# Patient Record
Sex: FEMALE | Race: WHITE | NOT HISPANIC OR LATINO | Employment: FULL TIME | URBAN - METROPOLITAN AREA
[De-identification: names, ages, dates, MRNs, and addresses within clinical notes are randomized per-mention and may not be internally consistent; named-entity substitution may affect disease eponyms.]

---

## 2017-01-13 ENCOUNTER — OFFICE VISIT (OUTPATIENT)
Dept: INTERNAL MEDICINE | Facility: CLINIC | Age: 42
End: 2017-01-13

## 2017-01-13 VITALS
TEMPERATURE: 97 F | HEART RATE: 77 BPM | SYSTOLIC BLOOD PRESSURE: 110 MMHG | BODY MASS INDEX: 40.62 KG/M2 | OXYGEN SATURATION: 98 % | HEIGHT: 67 IN | DIASTOLIC BLOOD PRESSURE: 68 MMHG | WEIGHT: 258.8 LBS

## 2017-01-13 DIAGNOSIS — Z53.21 PATIENT LEFT WITHOUT BEING SEEN: Primary | ICD-10-CM

## 2017-02-12 ENCOUNTER — ANESTHESIA EVENT (OUTPATIENT)
Dept: PERIOP | Facility: HOSPITAL | Age: 42
End: 2017-02-12

## 2017-02-13 ENCOUNTER — HOSPITAL ENCOUNTER (OUTPATIENT)
Facility: HOSPITAL | Age: 42
Discharge: HOME | End: 2017-02-14
Attending: FAMILY MEDICINE | Admitting: FAMILY MEDICINE

## 2017-02-13 ENCOUNTER — ANESTHESIA (OUTPATIENT)
Dept: PERIOP | Facility: HOSPITAL | Age: 42
End: 2017-02-13

## 2017-02-13 DIAGNOSIS — N62 MACROMASTIA: ICD-10-CM

## 2017-02-13 LAB
B-HCG UR QL: NEGATIVE
DEPRECATED RDW RBC AUTO: 42.1 FL (ref 37–54)
ERYTHROCYTE [DISTWIDTH] IN BLOOD BY AUTOMATED COUNT: 12.3 % (ref 11.3–14.5)
HCT VFR BLD AUTO: 37.1 % (ref 34.5–44)
HGB BLD-MCNC: 12.2 G/DL (ref 11.5–15.5)
INTERNAL NEGATIVE CONTROL: NORMAL
INTERNAL POSITIVE CONTROL: REACTIVE
Lab: NORMAL
MCH RBC QN AUTO: 30.7 PG (ref 27–31)
MCHC RBC AUTO-ENTMCNC: 32.9 G/DL (ref 32–36)
MCV RBC AUTO: 93.2 FL (ref 80–99)
PLATELET # BLD AUTO: 195 10*3/MM3 (ref 150–450)
PMV BLD AUTO: 11.5 FL (ref 6–12)
RBC # BLD AUTO: 3.98 10*6/MM3 (ref 3.89–5.14)
WBC NRBC COR # BLD: 12.09 10*3/MM3 (ref 3.5–10.8)

## 2017-02-13 PROCEDURE — 25010000002 EPINEPHRINE PER 0.1 MG: Performed by: FAMILY MEDICINE

## 2017-02-13 PROCEDURE — 25010000002 FENTANYL CITRATE (PF) 100 MCG/2ML SOLUTION: Performed by: NURSE ANESTHETIST, CERTIFIED REGISTERED

## 2017-02-13 PROCEDURE — 25010000002 BUPRENORPHINE PER 0.1 MG: Performed by: NURSE ANESTHETIST, CERTIFIED REGISTERED

## 2017-02-13 PROCEDURE — 25010000002 PROPOFOL 1000 MG/ML EMULSION: Performed by: NURSE ANESTHETIST, CERTIFIED REGISTERED

## 2017-02-13 PROCEDURE — 25010000002 DEXAMETHASONE PER 1 MG: Performed by: NURSE ANESTHETIST, CERTIFIED REGISTERED

## 2017-02-13 PROCEDURE — 25010000002 PROPOFOL 10 MG/ML EMULSION: Performed by: NURSE ANESTHETIST, CERTIFIED REGISTERED

## 2017-02-13 PROCEDURE — 85027 COMPLETE CBC AUTOMATED: CPT | Performed by: ANESTHESIOLOGY

## 2017-02-13 PROCEDURE — 88305 TISSUE EXAM BY PATHOLOGIST: CPT | Performed by: FAMILY MEDICINE

## 2017-02-13 PROCEDURE — 25010000002 DEXAMETHASONE SODIUM PHOSPHATE 10 MG/ML SOLUTION: Performed by: NURSE ANESTHETIST, CERTIFIED REGISTERED

## 2017-02-13 PROCEDURE — 25010000002 MIDAZOLAM PER 1 MG: Performed by: NURSE ANESTHETIST, CERTIFIED REGISTERED

## 2017-02-13 PROCEDURE — 25010000003 CEFAZOLIN PER 500 MG: Performed by: FAMILY MEDICINE

## 2017-02-13 PROCEDURE — 25010000002 ONDANSETRON PER 1 MG: Performed by: NURSE ANESTHETIST, CERTIFIED REGISTERED

## 2017-02-13 PROCEDURE — 25010000002 HYDROMORPHONE PER 4 MG: Performed by: NURSE ANESTHETIST, CERTIFIED REGISTERED

## 2017-02-13 RX ORDER — PANTOPRAZOLE SODIUM 40 MG/1
40 TABLET, DELAYED RELEASE ORAL
Status: DISCONTINUED | OUTPATIENT
Start: 2017-02-14 | End: 2017-02-14 | Stop reason: HOSPADM

## 2017-02-13 RX ORDER — CAFFEINE 200 MG
200 TABLET ORAL DAILY
COMMUNITY
End: 2020-09-29

## 2017-02-13 RX ORDER — PROMETHAZINE HYDROCHLORIDE 25 MG/1
25 TABLET ORAL ONCE AS NEEDED
Status: DISCONTINUED | OUTPATIENT
Start: 2017-02-13 | End: 2017-02-13 | Stop reason: HOSPADM

## 2017-02-13 RX ORDER — LIDOCAINE HYDROCHLORIDE 10 MG/ML
INJECTION, SOLUTION INFILTRATION; PERINEURAL AS NEEDED
Status: DISCONTINUED | OUTPATIENT
Start: 2017-02-13 | End: 2017-02-13 | Stop reason: SURG

## 2017-02-13 RX ORDER — CARISOPRODOL 350 MG/1
350 TABLET ORAL EVERY 8 HOURS PRN
Status: DISCONTINUED | OUTPATIENT
Start: 2017-02-13 | End: 2017-02-14 | Stop reason: HOSPADM

## 2017-02-13 RX ORDER — SODIUM CHLORIDE 0.9 % (FLUSH) 0.9 %
1-10 SYRINGE (ML) INJECTION AS NEEDED
Status: DISCONTINUED | OUTPATIENT
Start: 2017-02-13 | End: 2017-02-13 | Stop reason: HOSPADM

## 2017-02-13 RX ORDER — FAMOTIDINE 20 MG/1
20 TABLET, FILM COATED ORAL
Status: DISCONTINUED | OUTPATIENT
Start: 2017-02-13 | End: 2017-02-13 | Stop reason: HOSPADM

## 2017-02-13 RX ORDER — FAMOTIDINE 10 MG/ML
20 INJECTION, SOLUTION INTRAVENOUS
Status: DISCONTINUED | OUTPATIENT
Start: 2017-02-13 | End: 2017-02-13 | Stop reason: HOSPADM

## 2017-02-13 RX ORDER — ATRACURIUM BESYLATE 10 MG/ML
INJECTION, SOLUTION INTRAVENOUS AS NEEDED
Status: DISCONTINUED | OUTPATIENT
Start: 2017-02-13 | End: 2017-02-13 | Stop reason: SURG

## 2017-02-13 RX ORDER — MULTIPLE VITAMINS W/ MINERALS TAB 9MG-400MCG
1 TAB ORAL DAILY
Status: DISCONTINUED | OUTPATIENT
Start: 2017-02-14 | End: 2017-02-14 | Stop reason: HOSPADM

## 2017-02-13 RX ORDER — DEXAMETHASONE SODIUM PHOSPHATE 4 MG/ML
INJECTION, SOLUTION INTRA-ARTICULAR; INTRALESIONAL; INTRAMUSCULAR; INTRAVENOUS; SOFT TISSUE AS NEEDED
Status: DISCONTINUED | OUTPATIENT
Start: 2017-02-13 | End: 2017-02-13 | Stop reason: SURG

## 2017-02-13 RX ORDER — SODIUM CHLORIDE, SODIUM LACTATE, POTASSIUM CHLORIDE, CALCIUM CHLORIDE 600; 310; 30; 20 MG/100ML; MG/100ML; MG/100ML; MG/100ML
9 INJECTION, SOLUTION INTRAVENOUS CONTINUOUS PRN
Status: DISCONTINUED | OUTPATIENT
Start: 2017-02-13 | End: 2017-02-13 | Stop reason: HOSPADM

## 2017-02-13 RX ORDER — NAPROXEN 500 MG/1
500 TABLET ORAL 2 TIMES DAILY WITH MEALS
Status: DISCONTINUED | OUTPATIENT
Start: 2017-02-13 | End: 2017-02-14 | Stop reason: HOSPADM

## 2017-02-13 RX ORDER — LIDOCAINE HYDROCHLORIDE 10 MG/ML
0.5 INJECTION, SOLUTION EPIDURAL; INFILTRATION; INTRACAUDAL; PERINEURAL ONCE AS NEEDED
Status: COMPLETED | OUTPATIENT
Start: 2017-02-13 | End: 2017-02-13

## 2017-02-13 RX ORDER — MAGNESIUM HYDROXIDE 1200 MG/15ML
LIQUID ORAL AS NEEDED
Status: DISCONTINUED | OUTPATIENT
Start: 2017-02-13 | End: 2017-02-13 | Stop reason: HOSPADM

## 2017-02-13 RX ORDER — FENTANYL CITRATE 50 UG/ML
INJECTION, SOLUTION INTRAMUSCULAR; INTRAVENOUS AS NEEDED
Status: DISCONTINUED | OUTPATIENT
Start: 2017-02-13 | End: 2017-02-13 | Stop reason: SURG

## 2017-02-13 RX ORDER — OXYCODONE HYDROCHLORIDE AND ACETAMINOPHEN 5; 325 MG/1; MG/1
1 TABLET ORAL EVERY 4 HOURS PRN
Status: DISCONTINUED | OUTPATIENT
Start: 2017-02-13 | End: 2017-02-14 | Stop reason: HOSPADM

## 2017-02-13 RX ORDER — BUPRENORPHINE HYDROCHLORIDE 0.32 MG/ML
INJECTION INTRAMUSCULAR; INTRAVENOUS AS NEEDED
Status: DISCONTINUED | OUTPATIENT
Start: 2017-02-13 | End: 2017-02-13 | Stop reason: SURG

## 2017-02-13 RX ORDER — SODIUM CHLORIDE 9 MG/ML
75 INJECTION, SOLUTION INTRAVENOUS CONTINUOUS
Status: DISCONTINUED | OUTPATIENT
Start: 2017-02-13 | End: 2017-02-14 | Stop reason: HOSPADM

## 2017-02-13 RX ORDER — PROMETHAZINE HYDROCHLORIDE 25 MG/ML
6.25 INJECTION, SOLUTION INTRAMUSCULAR; INTRAVENOUS ONCE AS NEEDED
Status: DISCONTINUED | OUTPATIENT
Start: 2017-02-13 | End: 2017-02-13 | Stop reason: HOSPADM

## 2017-02-13 RX ORDER — FENTANYL CITRATE 50 UG/ML
50 INJECTION, SOLUTION INTRAMUSCULAR; INTRAVENOUS
Status: DISCONTINUED | OUTPATIENT
Start: 2017-02-13 | End: 2017-02-13 | Stop reason: HOSPADM

## 2017-02-13 RX ORDER — ONDANSETRON 2 MG/ML
INJECTION INTRAMUSCULAR; INTRAVENOUS AS NEEDED
Status: DISCONTINUED | OUTPATIENT
Start: 2017-02-13 | End: 2017-02-13 | Stop reason: SURG

## 2017-02-13 RX ORDER — PROPOFOL 10 MG/ML
VIAL (ML) INTRAVENOUS AS NEEDED
Status: DISCONTINUED | OUTPATIENT
Start: 2017-02-13 | End: 2017-02-13 | Stop reason: SURG

## 2017-02-13 RX ORDER — BUPIVACAINE HYDROCHLORIDE 2.5 MG/ML
INJECTION, SOLUTION EPIDURAL; INFILTRATION; INTRACAUDAL AS NEEDED
Status: DISCONTINUED | OUTPATIENT
Start: 2017-02-13 | End: 2017-02-13 | Stop reason: SURG

## 2017-02-13 RX ORDER — DULOXETIN HYDROCHLORIDE 20 MG/1
20 CAPSULE, DELAYED RELEASE ORAL EVERY 12 HOURS SCHEDULED
Status: DISCONTINUED | OUTPATIENT
Start: 2017-02-13 | End: 2017-02-14 | Stop reason: HOSPADM

## 2017-02-13 RX ORDER — CAFFEINE 200 MG
200 TABLET ORAL DAILY
Status: DISCONTINUED | OUTPATIENT
Start: 2017-02-13 | End: 2017-02-14 | Stop reason: HOSPADM

## 2017-02-13 RX ORDER — MIDAZOLAM HYDROCHLORIDE 1 MG/ML
INJECTION INTRAMUSCULAR; INTRAVENOUS AS NEEDED
Status: DISCONTINUED | OUTPATIENT
Start: 2017-02-13 | End: 2017-02-13 | Stop reason: SURG

## 2017-02-13 RX ORDER — MEPERIDINE HYDROCHLORIDE 25 MG/ML
12.5 INJECTION INTRAMUSCULAR; INTRAVENOUS; SUBCUTANEOUS
Status: DISCONTINUED | OUTPATIENT
Start: 2017-02-13 | End: 2017-02-13 | Stop reason: HOSPADM

## 2017-02-13 RX ORDER — OXYCODONE HYDROCHLORIDE AND ACETAMINOPHEN 5; 325 MG/1; MG/1
1 TABLET ORAL ONCE AS NEEDED
Status: DISCONTINUED | OUTPATIENT
Start: 2017-02-13 | End: 2017-02-13 | Stop reason: HOSPADM

## 2017-02-13 RX ORDER — LABETALOL HYDROCHLORIDE 5 MG/ML
5 INJECTION, SOLUTION INTRAVENOUS
Status: DISCONTINUED | OUTPATIENT
Start: 2017-02-13 | End: 2017-02-13 | Stop reason: HOSPADM

## 2017-02-13 RX ORDER — DEXAMETHASONE SODIUM PHOSPHATE 10 MG/ML
INJECTION, SOLUTION INTRAMUSCULAR; INTRAVENOUS AS NEEDED
Status: DISCONTINUED | OUTPATIENT
Start: 2017-02-13 | End: 2017-02-13 | Stop reason: SURG

## 2017-02-13 RX ORDER — IPRATROPIUM BROMIDE AND ALBUTEROL SULFATE 2.5; .5 MG/3ML; MG/3ML
3 SOLUTION RESPIRATORY (INHALATION) ONCE AS NEEDED
Status: DISCONTINUED | OUTPATIENT
Start: 2017-02-13 | End: 2017-02-13 | Stop reason: HOSPADM

## 2017-02-13 RX ORDER — PROMETHAZINE HYDROCHLORIDE 25 MG/1
25 SUPPOSITORY RECTAL ONCE AS NEEDED
Status: DISCONTINUED | OUTPATIENT
Start: 2017-02-13 | End: 2017-02-13 | Stop reason: HOSPADM

## 2017-02-13 RX ADMIN — LIDOCAINE HYDROCHLORIDE 50 MG: 10 INJECTION, SOLUTION INFILTRATION; PERINEURAL at 12:15

## 2017-02-13 RX ADMIN — HYDROMORPHONE HYDROCHLORIDE 0.5 MG: 1 INJECTION, SOLUTION INTRAMUSCULAR; INTRAVENOUS; SUBCUTANEOUS at 16:55

## 2017-02-13 RX ADMIN — PROPOFOL 50 MCG/KG/MIN: 10 INJECTION, EMULSION INTRAVENOUS at 12:18

## 2017-02-13 RX ADMIN — DULOXETINE HYDROCHLORIDE 20 MG: 20 CAPSULE, DELAYED RELEASE ORAL at 21:22

## 2017-02-13 RX ADMIN — FENTANYL CITRATE 50 MCG: 50 INJECTION, SOLUTION INTRAMUSCULAR; INTRAVENOUS at 16:40

## 2017-02-13 RX ADMIN — SODIUM CHLORIDE 75 ML/HR: 9 INJECTION, SOLUTION INTRAVENOUS at 21:23

## 2017-02-13 RX ADMIN — ONDANSETRON 4 MG: 2 INJECTION INTRAMUSCULAR; INTRAVENOUS at 15:59

## 2017-02-13 RX ADMIN — SODIUM CHLORIDE, POTASSIUM CHLORIDE, SODIUM LACTATE AND CALCIUM CHLORIDE: 600; 310; 30; 20 INJECTION, SOLUTION INTRAVENOUS at 15:15

## 2017-02-13 RX ADMIN — DEXAMETHASONE SODIUM PHOSPHATE 8 MG: 4 INJECTION, SOLUTION INTRAMUSCULAR; INTRAVENOUS at 12:30

## 2017-02-13 RX ADMIN — NAPROXEN 500 MG: 500 TABLET ORAL at 21:22

## 2017-02-13 RX ADMIN — FENTANYL CITRATE 50 MCG: 50 INJECTION, SOLUTION INTRAMUSCULAR; INTRAVENOUS at 12:15

## 2017-02-13 RX ADMIN — CEFAZOLIN 2 G: 1 INJECTION, POWDER, FOR SOLUTION INTRAVENOUS at 15:14

## 2017-02-13 RX ADMIN — FAMOTIDINE 20 MG: 20 TABLET ORAL at 09:04

## 2017-02-13 RX ADMIN — HYDROMORPHONE HYDROCHLORIDE 0.5 MG: 1 INJECTION, SOLUTION INTRAMUSCULAR; INTRAVENOUS; SUBCUTANEOUS at 17:05

## 2017-02-13 RX ADMIN — FENTANYL CITRATE 50 MCG: 50 INJECTION, SOLUTION INTRAMUSCULAR; INTRAVENOUS at 16:23

## 2017-02-13 RX ADMIN — DEXAMETHASONE SODIUM PHOSPHATE 4 MG: 10 INJECTION, SOLUTION INTRAMUSCULAR; INTRAVENOUS at 12:19

## 2017-02-13 RX ADMIN — ATRACURIUM BESYLATE 50 MG: 10 INJECTION, SOLUTION INTRAVENOUS at 12:15

## 2017-02-13 RX ADMIN — CEFAZOLIN 2 G: 1 INJECTION, POWDER, FOR SOLUTION INTRAVENOUS at 12:13

## 2017-02-13 RX ADMIN — BUPIVACAINE HYDROCHLORIDE 60 ML: 2.5 INJECTION, SOLUTION EPIDURAL; INFILTRATION; INTRACAUDAL; PERINEURAL at 12:19

## 2017-02-13 RX ADMIN — SODIUM CHLORIDE, POTASSIUM CHLORIDE, SODIUM LACTATE AND CALCIUM CHLORIDE 9 ML/HR: 600; 310; 30; 20 INJECTION, SOLUTION INTRAVENOUS at 09:04

## 2017-02-13 RX ADMIN — LIDOCAINE HYDROCHLORIDE 0.2 ML: 10 INJECTION, SOLUTION EPIDURAL; INFILTRATION; INTRACAUDAL; PERINEURAL at 09:04

## 2017-02-13 RX ADMIN — BUPRENORPHINE HYDROCHLORIDE 0.3 MG: 0.3 INJECTION INTRAMUSCULAR; INTRAVENOUS at 12:19

## 2017-02-13 RX ADMIN — PROPOFOL 200 MG: 10 INJECTION, EMULSION INTRAVENOUS at 12:15

## 2017-02-13 RX ADMIN — OXYCODONE AND ACETAMINOPHEN 1 TABLET: 5; 325 TABLET ORAL at 21:23

## 2017-02-13 RX ADMIN — MIDAZOLAM HYDROCHLORIDE 2 MG: 1 INJECTION, SOLUTION INTRAMUSCULAR; INTRAVENOUS at 12:09

## 2017-02-13 RX ADMIN — HYDROMORPHONE HYDROCHLORIDE 0.5 MG: 1 INJECTION, SOLUTION INTRAMUSCULAR; INTRAVENOUS; SUBCUTANEOUS at 17:00

## 2017-02-13 RX ADMIN — CARISOPRODOL 350 MG: 350 TABLET ORAL at 19:50

## 2017-02-13 RX ADMIN — HYDROMORPHONE HYDROCHLORIDE 0.5 MG: 1 INJECTION, SOLUTION INTRAMUSCULAR; INTRAVENOUS; SUBCUTANEOUS at 16:50

## 2017-02-13 RX ADMIN — FENTANYL CITRATE 50 MCG: 50 INJECTION, SOLUTION INTRAMUSCULAR; INTRAVENOUS at 16:15

## 2017-02-13 NOTE — ANESTHESIA POSTPROCEDURE EVALUATION
Patient: Antonina Erwin    Procedure Summary     Date Anesthesia Start Anesthesia Stop Room / Location    02/13/17 1213  [facility]       Procedure Diagnosis Surgeon Provider    BREAST REDUCTION BILATERAL (Bilateral Chest) No diagnosis on file. MD Vikram Brown MD          Anesthesia Type: general, regional  Last vitals  BP   114/81   Temp   98.5   Pulse  96   Resp   18   SpO2   95     Post Anesthesia Care and Evaluation    Patient location during evaluation: PACU  Patient participation: complete - patient participated  Level of consciousness: awake and alert  Pain score: 0  Pain management: adequate  Airway patency: patent  Anesthetic complications: No anesthetic complications  PONV Status: none  Cardiovascular status: hemodynamically stable and acceptable  Respiratory status: nonlabored ventilation, acceptable and nasal cannula  Hydration status: acceptable

## 2017-02-13 NOTE — INTERVAL H&P NOTE
"[facility] Pre-op    Full history and physical note from office is up to date.  See office note attached.    Visit Vitals   • /81 (BP Location: Right arm, Patient Position: Lying)   • Pulse 77   • Temp 97.9 °F (36.6 °C) (Temporal Artery )   • Resp 18   • Ht 69\" (175.3 cm)   • Wt 262 lb (119 kg)   • LMP  (LMP Unknown)   • SpO2 97%   • BMI 38.69 kg/m2       IMM:  Influenza:  no  Pneumococcal:  no  Tetanus:  unknown    Cancer Staging (if applicable)  Cancer Patient: __ yes _x_no __unknown; If yes, clinical stage T:__ N:__M:__, stage group    Lanie Cobb, APRN 2/13/2017 9:38 AM  "

## 2017-02-13 NOTE — ANESTHESIA PROCEDURE NOTES
Peripheral Block    Patient location during procedure: OR  Reason for block: at surgeon's request and post-op pain management  Performed by  Anesthesiologist: DANA BANSAL  Preanesthetic Checklist  Completed: patient identified, site marked, surgical consent, pre-op evaluation, timeout performed, IV checked, risks and benefits discussed and monitors and equipment checked  Peripheral Block Prep:  Sterile barriers:cap, gloves, gown and mask  Prep: ChloraPrep  Patient monitoring: blood pressure monitoring, continuous pulse oximetry and EKG  Peripheral Procedure  Nursing cardiac assessment comments yes: General Anesthesia.  Guidance:ultrasound guided and landmark technique  Images:still images obtained  Block Type:PECS I and PECS II (PECS)  Injection Technique:single-shotNeedle Type:short-bevel  Needle Gauge:20 G    Medications  Preservative Free Saline:10ml  Comment:Block Injection:  Total volume of LA divided between Right and Left sided blocks       Adjuncts:   Buprenorphine 0.30 mcg ,Decadron 4 mg PSF  Local Injected:bupivacaine 0.25% without epinephrine Local Amount Injected:60mL  Post Assessment  Patient Tolerance:comfortable throughout block  Complications:no  Additional Notes  The pt. Was placed in the Supine Position and was anesthetized per  General Anesthesia .     The insertion site was prepped with CHG and Ultrasound guidance with In-Plane techniquewas  a 4inch BBraun 360 degree echogenic needle was visualized.  Normal Saline PSF was  utilized for hydrodissection of tissue. PECS 1 Block- Pectoralis Major and Minor where identified and LA was injected between PMM and PmM at the level of the 3rd Rib(10ml),  PECS 2-  Pectoralis Minor and Serratus muscle where identified and the needle was advanced laterally in-plane with the 4th rib as a backstop, pleura was monitored.  LA was injected between SA and PmM at the level of 4th rib( 20ml).  LA injection spread was visualized, injection was incremental  1-5ml, normal or low injection pressure, no intravascular injection, no pneumothorax appreciated.  Thank You.

## 2017-02-13 NOTE — OP NOTE
Preoperative/postoperative diagnosis: Symptomatic bilateral macromastia  Procedure: bilateral breast reduction (reduction weights 1011g left, 1013g right  Surgeon: Bere Mendez MD  Assistant(s): Mili Rehman and BARRY Escudero  EBL less than 10ml  Complications:none  HPI: the patient is a 41 year old with a history of bilateral symptomatic macromastia with neck, shoulder and upper back pain who presents for bilateral breast reduction.  Description of the procedure/findings: The patient was brought to the operating room where after general endotracheal anesthesia was established, she underwent bilateral pecs blocks per anesthesia. She was prepped and draped steriley and starting on the patient's right side, we deepithelialized all but a 45mm diameter areola on an inferiorly based dermoglandular pedicle. Central, medial and lateral reductions were performed. We then closed the incisions over a 10blake drain with soft tissue 2-0 vicryl, deep dermal 3-0 vicryl and 4-0 monocryl running subcuticular sutures.   We then secured the drain with a 2-0 silk drain suture. Lateral breast tumescent x 50ml was placed and after 7 minutes, liposuction with return of 50ml of lipoaspirate was performed with good contouring of the lateral breast.  Attention was turned to the left breast where in identical fashion, breast reduction including liporeduction with liposuction was performed (50ml as well). The incision was also closed over a 10blake drain in identical fasion. The weights of the reduction specimens were 1011g for the left sided breast tissue and 1013g for the right sided breast tissue.  She tolerated this procedure well. There were no complications.

## 2017-02-13 NOTE — ANESTHESIA PROCEDURE NOTES
Airway  Urgency: elective    Airway not difficult    General Information and Staff    Patient location during procedure: OR  Anesthesiologist: FAWAD SIMS  CRNA: EVERETTE MACHUCA    Indications and Patient Condition  Indications for airway management: airway protection    Preoxygenated: yes  MILS not maintained throughout  Mask difficulty assessment: 1 - vent by mask    Final Airway Details  Final airway type: endotracheal airway      Successful airway: ETT  Cuffed: yes   Successful intubation technique: direct laryngoscopy  Endotracheal tube insertion site: oral  Blade: Keny  Blade size: #3  ETT size: 7.0 mm  Cormack-Lehane Classification: grade I - full view of glottis  Placement verified by: chest auscultation and capnometry   Cuff volume (mL): 6  Measured from: lips  ETT to lips (cm): 20  Number of attempts at approach: 1    Additional Comments  Patient history, labs, physical exam, anesthetic plan reviewed with MDA and patient in preop.  Pt transported to room via RN. All ASA monitors applied, preoxygenated x 3 min/ ETO2 of >85, IV patent, smooth induction, easy intubation, + ETCO2, negative epigastric sounds, breath sound equal bilaterally with symmetric chest rise and fall, tube secured, all VSS, cspine neutrality maintained throughout induction, intubation and postitioning, all ppp, arms <90.

## 2017-02-13 NOTE — ANESTHESIA PREPROCEDURE EVALUATION
Anesthesia Evaluation     Patient summary reviewed and Nursing notes reviewed   no history of anesthetic complications:  NPO Status: > 8 hours   Airway   Mallampati: I  TM distance: >3 FB  Neck ROM: full  no difficulty expected  Dental - normal exam     Pulmonary - negative pulmonary ROS and normal exam    breath sounds clear to auscultation  Cardiovascular - negative cardio ROS and normal exam  Exercise tolerance: good (4-7 METS)    Rhythm: regular  Rate: normal        Neuro/Psych- negative ROS  GI/Hepatic/Renal/Endo    (+) obesity,  GERD well controlled,     Musculoskeletal     Abdominal   (+) obese,     Abdomen: soft.   Substance History      OB/GYN          Other                                    Anesthesia Plan    ASA 2     general and regional     intravenous induction   Anesthetic plan and risks discussed with patient.    Plan discussed with CRNA.

## 2017-02-14 VITALS
HEART RATE: 78 BPM | DIASTOLIC BLOOD PRESSURE: 58 MMHG | TEMPERATURE: 98 F | SYSTOLIC BLOOD PRESSURE: 105 MMHG | BODY MASS INDEX: 38.8 KG/M2 | HEIGHT: 69 IN | RESPIRATION RATE: 18 BRPM | WEIGHT: 262 LBS | OXYGEN SATURATION: 98 %

## 2017-02-14 RX ADMIN — DULOXETINE HYDROCHLORIDE 20 MG: 20 CAPSULE, DELAYED RELEASE ORAL at 07:54

## 2017-02-14 RX ADMIN — NAPROXEN 500 MG: 500 TABLET ORAL at 07:54

## 2017-02-14 RX ADMIN — OXYCODONE AND ACETAMINOPHEN 1 TABLET: 5; 325 TABLET ORAL at 09:58

## 2017-02-14 RX ADMIN — OXYCODONE AND ACETAMINOPHEN 1 TABLET: 5; 325 TABLET ORAL at 05:45

## 2017-02-14 RX ADMIN — MULTIPLE VITAMINS W/ MINERALS TAB 1 TABLET: TAB ORAL at 07:54

## 2017-02-14 RX ADMIN — PANTOPRAZOLE SODIUM 40 MG: 40 TABLET, DELAYED RELEASE ORAL at 05:45

## 2017-02-14 RX ADMIN — OXYCODONE AND ACETAMINOPHEN 1 TABLET: 5; 325 TABLET ORAL at 01:54

## 2017-02-14 NOTE — PROGRESS NOTES
"The pt reports that she is comfortable now.  Visit Vitals   • /61 (BP Location: Right arm, Patient Position: Lying)   • Pulse 84   • Temp 98.7 °F (37.1 °C) (Oral)   • Resp 16   • Ht 69\" (175.3 cm)   • Wt 262 lb (119 kg)   • LMP  (LMP Unknown)   • SpO2 97%   • BMI 38.69 kg/m2     Bilateral breast skin looks good. Drains serous. Incisions intact  cv reg resps clear, unlabored  Plan for discharge home today  "

## 2017-02-14 NOTE — PLAN OF CARE
Problem: Patient Care Overview (Adult)  Goal: Plan of Care Review  Outcome: Ongoing (interventions implemented as appropriate)    02/14/17 0417   Coping/Psychosocial Response Interventions   Plan Of Care Reviewed With patient   Patient Care Overview   Progress improving   Outcome Evaluation   Outcome Summary/Follow up Plan pain control       Goal: Adult Individualization and Mutuality  Outcome: Ongoing (interventions implemented as appropriate)  Goal: Discharge Needs Assessment  Outcome: Ongoing (interventions implemented as appropriate)    Problem: Perioperative Period (Adult)  Goal: Signs and Symptoms of Listed Potential Problems Will be Absent or Manageable (Perioperative Period)  Outcome: Ongoing (interventions implemented as appropriate)

## 2017-02-15 LAB
CYTO UR: NORMAL
LAB AP CASE REPORT: NORMAL
LAB AP CLINICAL INFORMATION: NORMAL
Lab: NORMAL
PATH REPORT.FINAL DX SPEC: NORMAL
PATH REPORT.GROSS SPEC: NORMAL

## 2017-05-31 RX ORDER — DICLOFENAC SODIUM 75 MG/1
TABLET, DELAYED RELEASE ORAL
Qty: 30 TABLET | Refills: 0 | Status: SHIPPED | OUTPATIENT
Start: 2017-05-31 | End: 2020-09-29

## 2020-08-25 ENCOUNTER — LAB (OUTPATIENT)
Dept: LAB | Facility: HOSPITAL | Age: 45
End: 2020-08-25

## 2020-08-25 ENCOUNTER — OFFICE VISIT (OUTPATIENT)
Dept: NEUROLOGY | Facility: CLINIC | Age: 45
End: 2020-08-25

## 2020-08-25 VITALS
HEIGHT: 69 IN | BODY MASS INDEX: 42.06 KG/M2 | OXYGEN SATURATION: 99 % | HEART RATE: 96 BPM | SYSTOLIC BLOOD PRESSURE: 126 MMHG | WEIGHT: 284 LBS | DIASTOLIC BLOOD PRESSURE: 80 MMHG | TEMPERATURE: 97.3 F

## 2020-08-25 DIAGNOSIS — M54.16 LUMBAR RADICULOPATHY: ICD-10-CM

## 2020-08-25 DIAGNOSIS — G62.9 PERIPHERAL POLYNEUROPATHY: Primary | ICD-10-CM

## 2020-08-25 DIAGNOSIS — G62.9 PERIPHERAL POLYNEUROPATHY: ICD-10-CM

## 2020-08-25 LAB
FOLATE SERPL-MCNC: 11 NG/ML (ref 4.78–24.2)
T4 FREE SERPL-MCNC: 1.21 NG/DL (ref 0.93–1.7)
TSH SERPL DL<=0.05 MIU/L-ACNC: 1.68 UIU/ML (ref 0.27–4.2)
VIT B12 BLD-MCNC: 1218 PG/ML (ref 211–946)

## 2020-08-25 PROCEDURE — 36415 COLL VENOUS BLD VENIPUNCTURE: CPT

## 2020-08-25 PROCEDURE — 84207 ASSAY OF VITAMIN B-6: CPT

## 2020-08-25 PROCEDURE — 99204 OFFICE O/P NEW MOD 45 MIN: CPT | Performed by: PSYCHIATRY & NEUROLOGY

## 2020-08-25 PROCEDURE — 84165 PROTEIN E-PHORESIS SERUM: CPT

## 2020-08-25 PROCEDURE — 82607 VITAMIN B-12: CPT

## 2020-08-25 PROCEDURE — 84155 ASSAY OF PROTEIN SERUM: CPT

## 2020-08-25 PROCEDURE — 84439 ASSAY OF FREE THYROXINE: CPT

## 2020-08-25 PROCEDURE — 84443 ASSAY THYROID STIM HORMONE: CPT

## 2020-08-25 PROCEDURE — 86038 ANTINUCLEAR ANTIBODIES: CPT

## 2020-08-25 PROCEDURE — 82746 ASSAY OF FOLIC ACID SERUM: CPT

## 2020-08-25 RX ORDER — LORATADINE 10 MG/1
10 TABLET ORAL DAILY
COMMUNITY

## 2020-08-25 RX ORDER — CALCIUM CARBONATE 750 MG/1
750 TABLET, CHEWABLE ORAL DAILY
COMMUNITY
End: 2020-09-29

## 2020-08-25 NOTE — PROGRESS NOTES
Subjective:    CC: Antonina Mensah is seen today in consultation at the request of Deandra Agudelo DPM for Peripheral Neuropathy (Foot pain)       HPI:  44-year-old female with a history of hyperlipidemia and plantar fasciitis presents with foot pain.  As per patient she started having pain in the bottoms of both her feet  a few years ago.  Initially prescribed gabapentin and Lyrica that did not help.  She was diagnosed with plantar fasciitis on both sides and underwent treatment from a podiatrist including wearing supportive boots.  She states that over time the symptoms in her right foot have improved but she continues to have severe pain in the arch of her left foot as well as mild pain in her right heel.  Her podiatrist also did skin biopsies on both sides and diagnosed her with neuropathy, moderate on the left and mild on the right.  (has not had an EMG/NCS recently). Her last TSH was 5 and last TSH was normal . She denies any alcohol intake or family h/o neuropathy. She states that she also has low back pain with a dull achy pain/numbness in the outer aspect of her left thigh.  This worsens with prolonged standing.  Patient is currently on short-term disability from her work at [employer] as she could not stand on concrete for long.    The following portions of the patient's history were reviewed today and updated as of 08/25/2020  : allergies, current medications, past family history, past medical history, past social history, past surgical history and problem list  These document will be scanned to patient's chart.      Current Outpatient Medications:   •  calcium carbonate EX (TUMS EX) 750 MG chewable tablet, Chew 750 mg Daily., Disp: , Rfl:   •  loratadine (CLARITIN) 10 MG tablet, Take 10 mg by mouth Daily., Disp: , Rfl:   •  Multiple Vitamins-Minerals (MULTIVITAMIN ADULT PO), Take 1 tablet by mouth Daily., Disp: , Rfl:   •  Nutritional Supplements (QUINOA KALE & HEMP PO), Take 1 capsule by mouth Daily.,  Disp: , Rfl:   •  caffeine 200 MG tablet, Take 200 mg by mouth Daily., Disp: , Rfl:   •  diclofenac (VOLTAREN) 75 MG EC tablet, TAKE ONE TABLET BY MOUTH ONCE DAILY, Disp: 30 tablet, Rfl: 0  •  DULoxetine (CYMBALTA) 20 MG capsule, Take 1 capsule by mouth 2 (Two) Times a Day., Disp: 60 capsule, Rfl: 1  •  omeprazole (PRILOSEC) 40 MG capsule, Take 1 capsule by mouth Daily., Disp: 30 capsule, Rfl: 5   Past Medical History:   Diagnosis Date   • Allergies    • Depression    • Gait abnormality    • Headache, tension-type    • History of reduction mammoplasty 02/13/2017    bilateral   • Kidney stone    • Memory loss    • Overweight    • Ruptured ovarian cyst       Past Surgical History:   Procedure Laterality Date   • APPENDECTOMY  2014   • APPENDECTOMY     • BILATERAL BREAST REDUCTION Bilateral 2/13/2017    Procedure: BREAST REDUCTION BILATERAL;  Surgeon: Bere Mendez MD;  Location: [facility];  Service:    • BILATERAL BREAST REDUCTION     • CYST REMOVAL      From groin   • CYST REMOVAL     • ENDOMETRIAL ABLATION  08/2016   • OTHER SURGICAL HISTORY Bilateral 2015    vein ablation    • TUBAL ABDOMINAL LIGATION  2015   • WISDOM TOOTH EXTRACTION        Family History   Problem Relation Age of Onset   • Depression Father    • Alcohol abuse Father    • Crohn's disease Sister    • Heart attack Maternal Grandfather    • Atrial fibrillation Maternal Grandfather    • Lung cancer Paternal Grandfather    • COPD Paternal Grandfather       Social History     Socioeconomic History   • Marital status: Single     Spouse name: Not on file   • Number of children: Not on file   • Years of education: Not on file   • Highest education level: Not on file   Tobacco Use   • Smoking status: Former Smoker     Types: Cigarettes   • Smokeless tobacco: Never Used   Substance and Sexual Activity   • Alcohol use: Yes     Comment: PT. STATES SHE HAS AN OCCASIONAL DRINK.   • Drug use: No   • Sexual activity: Defer     Review of Systems  "  Neurological: Positive for numbness and headache.   Psychiatric/Behavioral: Positive for depressed mood.   All other systems reviewed and are negative.      Objective:    /80   Pulse 96   Temp 97.3 °F (36.3 °C)   Ht 175.3 cm (69\")   Wt 129 kg (284 lb)   SpO2 99%   BMI 41.94 kg/m²     Neurology Exam:    General apperance: Obese    Mental status: Alert, awake and oriented to time place and person.    Recent and Remote memory: Intact.    Attention span and Concentration: Normal.     Language and Speech: Intact- No dysarthria.    Fluency, Naming , Repitition and Comprehension:  Intact    Cranial Nerves:   CN II: Visual fields are full. Intact. Fundi - Normal, No papillederma, Pupils - LES  CN III, IV and VI: Extraocular movements are intact. Normal saccades.   CN V: Facial sensation is intact.   CN VII: Muscles of facial expression reveal no asymmetry. Intact.   CN VIII: Hearing is intact. Whispered voice intact.   CN IX and X: Palate elevates symmetrically. Intact  CN XI: Shoulder shrug is intact.   CN XII: Tongue is midline without evidence of atrophy or fasciculation.     Ophthalmoscopic exam of optic disc-normal    Motor:-She has high arched feet    Right UE muscle strength 5/5. Normal tone.     Left UE muscle strength 5/5. Normal tone.      Right LE muscle strength5/5. Normal tone.     Left LE muscle strength 5/5. Normal tone.      Sensory: Intact to LT,PP but mildly reduced vibration in LE    DTRs: 2+ bilaterally in upper and lower extremities.    Babinski: Negative bilaterally.    Co-ordination: Normal finger-to-nose, heel to shin B/L.    Rhomberg: Negative.    Gait: Normal.    Cardiovascular: Regular rate and rhythm without murmur, gallop or rub.    Assessment and Plan:  1. Peripheral polyneuropathy  I feel most of the symptoms in her feet are because of plantar fasciitis.  She could have mild neuropathy.  I will get an EMG/NCS.  Will also check a neuropathy panel    - EN by IFA, Reflex " 9-biomarkers profile; Future  - Protein Elec + Interp, Serum; Future  - TSH+Free T4; Future  - Vitamin B12 & Folate; Future  - Vitamin B6; Future  - EMG & Nerve Conduction Test; Future    2. Lumbar radiculopathy  Patient has symptoms of Lateral femoral cutaneous neuropathy on the left  I will get a MRI lumbar spine as well as an EMG  - EMG & Nerve Conduction Test; Future  - MRI Lumbar Spine Without Contrast; Future       Return in about 5 weeks (around 9/29/2020).         Anushka Gonzales MD

## 2020-08-26 LAB
ALBUMIN SERPL-MCNC: 3.3 G/DL (ref 2.9–4.4)
ALBUMIN/GLOB SERPL: 0.9 {RATIO} (ref 0.7–1.7)
ALPHA1 GLOB FLD ELPH-MCNC: 0.3 G/DL (ref 0–0.4)
ALPHA2 GLOB SERPL ELPH-MCNC: 0.9 G/DL (ref 0.4–1)
B-GLOBULIN SERPL ELPH-MCNC: 1.2 G/DL (ref 0.7–1.3)
GAMMA GLOB SERPL ELPH-MCNC: 1.3 G/DL (ref 0.4–1.8)
GLOBULIN SER CALC-MCNC: 3.7 G/DL (ref 2.2–3.9)
Lab: NORMAL
M-SPIKE: NORMAL G/DL
PROT PATTERN SERPL ELPH-IMP: NORMAL
PROT SERPL-MCNC: 7 G/DL (ref 6–8.5)

## 2020-08-27 LAB
ANA SER QL IA: NEGATIVE
Lab: NORMAL

## 2020-08-30 LAB — VIT B6 SERPL-MCNC: 42.6 UG/L (ref 2–32.8)

## 2020-08-31 ENCOUNTER — TELEPHONE (OUTPATIENT)
Dept: NEUROLOGY | Facility: CLINIC | Age: 45
End: 2020-08-31

## 2020-08-31 NOTE — TELEPHONE ENCOUNTER
Called and LVM informing pt of labs and informed per Dr. Gonzales's advise. Informed if have any questions or concerns to please give office a call. Thanks.

## 2020-08-31 NOTE — TELEPHONE ENCOUNTER
----- Message from Anushka Gonzales MD sent at 8/31/2020  9:46 AM EDT -----  Please tell the patient that her B6 and B12 level are both elevated and see if she is taking supplements then she should stop taking it a while.  Other labs were all within normal limits.

## 2020-09-01 ENCOUNTER — TELEPHONE (OUTPATIENT)
Dept: NEUROLOGY | Facility: CLINIC | Age: 45
End: 2020-09-01

## 2020-09-01 NOTE — TELEPHONE ENCOUNTER
PATIENT CALLED TO FIND OUT IF DR PIERCE IS OK WITH PATIENT HAVING MRI AND EEG COMPLETED AT A DIFERRENT LOCATION.     HER PODIATRIST ORDERED AN MRI OF HER FOOT AND WAS TOLD THAT THEY COULD DO BOTH MRI'S ON THE SAME DAY AT [facility].     HER PODIATRIST ALSO SAID THEY COULD SCHEDULE HER EEG SOONER THAN October.    PLEASE CALL PATIENT -[phone]

## 2020-09-02 NOTE — TELEPHONE ENCOUNTER
She can get the MRI of the lumbar spine at [facility] however I want her to get the EMG/nerve conduction study  at [facility] [place] as Dr. Turcios is really good .

## 2020-09-02 NOTE — TELEPHONE ENCOUNTER
Pt would like to have her tests at a different location.  The podiatrist has someone that comes to her office that does EEG's and can do it in a couple weeks as compared to [place] is scheduling into October.  Do you prefer her to do it in [place] or does it matter?

## 2020-09-11 ENCOUNTER — TELEPHONE (OUTPATIENT)
Dept: NEUROLOGY | Facility: CLINIC | Age: 45
End: 2020-09-11

## 2020-09-11 NOTE — TELEPHONE ENCOUNTER
PT IS SCHEDULED TODAY AT [facility] TO HAVE AN MRI OF THE LUMBAR SPINE WITHOUT CONTRAST AND [facility] CALLED STATING THE PRE CERT IS CURRENTLY SHOWING IN PROGRESS AND IT'S DUE TO CLOSE TODAY BUT SHE STATES THAT COULD MEAN IT WILL CLOSE AT MIDNIGHT TONIGHT AND THE PT'S APPT IS AT 1:15. SHE IS REQUESTING SOMEONE CALL THE [facility] TO PUSH IT THROUGH SO PT CAN BE SEEN. PLEASE ADVISE ASAP AS PT IS COMING FROM [place] TO HAVE MRI DONE      CALL BACK- [phone]

## 2020-09-14 ENCOUNTER — APPOINTMENT (OUTPATIENT)
Dept: MRI IMAGING | Facility: HOSPITAL | Age: 45
End: 2020-09-14

## 2020-09-21 ENCOUNTER — TELEPHONE (OUTPATIENT)
Dept: NEUROLOGY | Facility: CLINIC | Age: 45
End: 2020-09-21

## 2020-09-21 ENCOUNTER — HOSPITAL ENCOUNTER (OUTPATIENT)
Dept: NEUROLOGY | Facility: HOSPITAL | Age: 45
Discharge: HOME | End: 2020-09-21
Admitting: PSYCHIATRY & NEUROLOGY

## 2020-09-21 DIAGNOSIS — G62.9 PERIPHERAL POLYNEUROPATHY: ICD-10-CM

## 2020-09-21 DIAGNOSIS — M54.16 LUMBAR RADICULOPATHY: ICD-10-CM

## 2020-09-21 PROCEDURE — 95886 MUSC TEST DONE W/N TEST COMP: CPT

## 2020-09-21 PROCEDURE — 95913 NRV CNDJ TEST 13/> STUDIES: CPT

## 2020-09-22 NOTE — TELEPHONE ENCOUNTER
I thought I will discuss the test results with her at her appointment next week but you can let her know that her test showed mild neuropathy as well as some nerve irritation in 1 of her nerves coming off her back going down into her leg.  If her leg pain  is severe then I can send her to pain management as they can block the nerve.  Let me know if she agrees.  For the neuropathy we can discuss the medications at her appointment.

## 2020-09-29 ENCOUNTER — OFFICE VISIT (OUTPATIENT)
Dept: NEUROLOGY | Facility: CLINIC | Age: 45
End: 2020-09-29

## 2020-09-29 VITALS
WEIGHT: 283.2 LBS | SYSTOLIC BLOOD PRESSURE: 118 MMHG | TEMPERATURE: 97.3 F | DIASTOLIC BLOOD PRESSURE: 86 MMHG | HEIGHT: 69 IN | BODY MASS INDEX: 41.95 KG/M2

## 2020-09-29 DIAGNOSIS — G62.9 PERIPHERAL POLYNEUROPATHY: ICD-10-CM

## 2020-09-29 DIAGNOSIS — M79.672 FOOT PAIN, LEFT: ICD-10-CM

## 2020-09-29 DIAGNOSIS — G57.12 LATERAL FEMORAL CUTANEOUS NEUROPATHY, LEFT: Primary | ICD-10-CM

## 2020-09-29 PROCEDURE — 99214 OFFICE O/P EST MOD 30 MIN: CPT | Performed by: PSYCHIATRY & NEUROLOGY

## 2020-09-29 NOTE — PROGRESS NOTES
Subjective:    CC: Antonina Mensah is seen today for Peripheral Neuropathy and leg pain       HPI:  Current visit- since the last visit patient states that she continues to have pain and numbness in her lower back that radiates down to the outer aspect of her left thigh.  Initially the symptoms were with prolonged standing however they are now triggered by even standing for just short periods of time.  She had a MRI of the lumbar spine that I personally reviewed today that showed mild degenerative changes with small disc bulges at L2-3 and L5-S1 but no significant canal or neural foraminal stenosis.  She also had a MRI of her left foot as her pain had worsened and it showed plantar fasciitis with partial tearing of the medial band along with tendinopathy and/or partial injury of the peroneus brevis and longus tendons as well as a possible ganglion cyst in the posterior ankle.  Patient has an appointment to see Dr. Han from orthopedics at .  She is currently having excruciating pain in the arch of her left foot.  She also had an EMG/NCS that showed mild peripheral neuropathy as well as moderate lateral femoral cutaneous neuropathy on the left.  Her neuropathy panel was normal other than a slightly elevated vitamin B6 and B12 level.  She has not stopped taking supplements now.  Has also been undergoing physical therapy for the past 4 weeks for her foot and low back issues however she continues to have the pain.    Initial sgmol-22-tyvs-old female with a history of hyperlipidemia and plantar fasciitis presents with foot pain.  As per patient she started having pain in the bottoms of both her feet  a few years ago.  Initially prescribed gabapentin and Lyrica that did not help.  She was diagnosed with plantar fasciitis on both sides and underwent treatment from a podiatrist including wearing supportive boots.  She states that over time the symptoms in her right foot have improved but she continues to have  severe pain in the arch of her left foot as well as mild pain in her right heel.  Her podiatrist also did skin biopsies on both sides and diagnosed her with neuropathy, moderate on the left and mild on the right.  (has not had an EMG/NCS recently). Her last TSH was 5 and last TSH was normal . She denies any alcohol intake or family h/o neuropathy. She states that she also has low back pain with a dull achy pain/numbness in the outer aspect of her left thigh.  This worsens with prolonged standing.  Patient is currently on short-term disability from her work at [employer] as she could not stand on concrete for long.    The following portions of the patient's history were reviewed today and updated as of 08/25/2020  : allergies, current medications, past family history, past medical history, past social history, past surgical history and problem list  These document will be scanned to patient's chart.      Current Outpatient Medications:   •  loratadine (CLARITIN) 10 MG tablet, Take 10 mg by mouth Daily., Disp: , Rfl:   •  Multiple Vitamins-Minerals (MULTIVITAMIN ADULT PO), Take 1 tablet by mouth Daily., Disp: , Rfl:    Past Medical History:   Diagnosis Date   • Allergies    • Depression    • Gait abnormality    • Headache, tension-type    • History of reduction mammoplasty 02/13/2017    bilateral   • Kidney stone    • Memory loss    • Overweight    • Ruptured ovarian cyst       Past Surgical History:   Procedure Laterality Date   • APPENDECTOMY  2014   • APPENDECTOMY     • BILATERAL BREAST REDUCTION Bilateral 2/13/2017    Procedure: BREAST REDUCTION BILATERAL;  Surgeon: Bere Mendez MD;  Location: [facility];  Service:    • BILATERAL BREAST REDUCTION     • CYST REMOVAL      From groin   • CYST REMOVAL     • ENDOMETRIAL ABLATION  08/2016   • OTHER SURGICAL HISTORY Bilateral 2015    vein ablation    • TUBAL ABDOMINAL LIGATION  2015   • WISDOM TOOTH EXTRACTION        Family History   Problem Relation Age of Onset   •  "Depression Father    • Alcohol abuse Father    • Crohn's disease Sister    • Heart attack Maternal Grandfather    • Atrial fibrillation Maternal Grandfather    • Lung cancer Paternal Grandfather    • COPD Paternal Grandfather       Social History     Socioeconomic History   • Marital status:      Spouse name: Not on file   • Number of children: Not on file   • Years of education: Not on file   • Highest education level: Not on file   Tobacco Use   • Smoking status: Former Smoker     Types: Cigarettes   • Smokeless tobacco: Never Used   Substance and Sexual Activity   • Alcohol use: Yes     Comment: PT. STATES SHE HAS AN OCCASIONAL DRINK.   • Drug use: No   • Sexual activity: Defer     Review of Systems   Musculoskeletal: Positive for gait problem.   Neurological: Positive for numbness.   Psychiatric/Behavioral: Positive for depressed mood.   All other systems reviewed and are negative.      Objective:    /86   Temp 97.3 °F (36.3 °C) (Temporal)   Ht 175.3 cm (69.02\")   Wt 128 kg (283 lb 3.2 oz)   BMI 41.80 kg/m²     Neurology Exam:    General apperance: Obese    Mental status: Alert, awake and oriented to time place and person.    Recent and Remote memory: Intact.    Attention span and Concentration: Normal.     Language and Speech: Intact- No dysarthria.    Fluency, Naming , Repitition and Comprehension:  Intact    Cranial Nerves:   CN II: Visual fields are full. Intact. Fundi - Normal, No papillederma, Pupils - LES  CN III, IV and VI: Extraocular movements are intact. Normal saccades.   CN V: Facial sensation is intact.   CN VII: Muscles of facial expression reveal no asymmetry. Intact.   CN VIII: Hearing is intact. Whispered voice intact.   CN IX and X: Palate elevates symmetrically. Intact  CN XI: Shoulder shrug is intact.   CN XII: Tongue is midline without evidence of atrophy or fasciculation.     Ophthalmoscopic exam of optic disc-normal    Motor:-She has high arched feet    Right UE muscle " strength 5/5. Normal tone.     Left UE muscle strength 5/5. Normal tone.      Right LE muscle strength5/5. Normal tone.     Left LE muscle strength 5/5. Normal tone.      Sensory: Intact to LT,PP but mildly reduced vibration in LE    DTRs: 2+ bilaterally in upper and lower extremities.    Babinski: Negative bilaterally.    Co-ordination: Normal finger-to-nose, heel to shin B/L.    Rhomberg: Negative.    Gait: Normal.    Cardiovascular: Regular rate and rhythm without murmur, gallop or rub.    Assessment and Plan:  1. Peripheral polyneuropathy  I feel most of the symptoms in her feet are because of plantar fasciitis.  EMG/NCS showed mild neuropathy.    -I will defer starting her on any medications for the neuropathy as it is mild  -She is going to see an orthopedic surgeon for her foot soon    2.  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  Has also been undergoing PT for the past 4 weeks   I will refer her to Dr. Sanchez to get a nerve block       Return in about 3 months (around 12/29/2020).         Anushka Gonzales MD

## 2020-10-19 ENCOUNTER — APPOINTMENT (OUTPATIENT)
Dept: NEUROLOGY | Facility: HOSPITAL | Age: 45
End: 2020-10-19

## 2020-10-20 NOTE — PROGRESS NOTES
"Chief Complaint: \" Pain in my lower back. Numbness in my back and thighs LT>RT.\"      History of Present Illness:   Patient: Ms. Antonina Mensah, 45 y.o. female   Referring Physician: Dr. Anushka Gonzales  Reason for Referral: Consultation for chronic intractable lower back pain and numbness in her thighs   Pain History: Patient reports a several year history of lower back pain, which began without incident. Patient underwent recent neurology consultation with Dr. Gonzales for peripheral neuropathy. Patient has a history of chronic lower back pain and some numbness affecting the anterolateral aspect of her left > right thigh. In addition, she has been experiencing some associated pain in the arch of her left foot attributed to plantar fascia tear. Patient reports that she experiences numbness in her thighs immediately after standing or walking.  MRI of the lumbar spine revealed degenerative disc disease from L2-L3 through L5-S1 without significant canal or foraminal stenosis. MRI of the left foot revealed plantar fasciitis with partial tear of the medial band along with tendinopathy of the peroneus brevis and peroneus longus tendons and a possible ganglion cyst in the posterior ankle.  She has been diagnosed with plantar fasciitis and has undergone treatment with a podiatrist including the use of boots.  Patient reports some improvement in her right foot.  Unfortunately, she continues experiencing severe left-sided foot pain.  Patient has an appointment with Dr. Han in orthopedics at the [facility].  EMG/NCV revealed mild peripheral polyneuropathy and moderate left lateral femoral cutaneous neuropathy. Patient reports that she underwent skin biopsies with her podiatrist and was diagnosed with small fiber neuropathy. Patient has failed conservative measures including oral analgesics and physical therapy.  She failed trials with gabapentin (\"it doesn't do a freaking thing for me. I can take a bottle. It " makes me eat) and Lyrica (blurry vision). Pain has progressed in intensity over the past months.   Pain Description: Constant pain with intermittent exacerbation, described as aching, tingling, numbness, and throbbing sensation.   Radiation of Pain: The pain radiates from the lower back into the anterolateral aspect of her thighs, more on the left than the right side  Pain intensity today: 3/10  Average pain intensity last week: 6/10  Pain intensity ranges from: 3/10 to 8/10  Aggravating factors: Pain increases immediately with standing   Alleviating factors: Pain decreases with sitting, lying and stretching.   Associated Symptoms:   Patient reports numbness in the anterior lateral aspect of her thighs   Patient denies  any new bladder or bowel problems.   Patient denies  difficulties with her balance or recent falls.     Review of previous therapies and additional medical records:  Antonina Mensah has already failed the following measures, including:   Conservative Measures: Oral analgesics, topical analgesics and physical therapy   Interventional Measures: None  Surgical Measures: No history of previous lumbar spine or hip surgery   Antonina Mensah has not undergone neurosurgical or orthopedic spine consultation for this condition   Antonina Mensah presents with significant comorbidities including headaches, depression, overweight, memory loss  In terms of current analgesics, Antonina Mensah takes: None at this time  I have reviewed Stefan Report #[id number] consistent with medication reconciliation.  SOAPP: Low Risk    Global Pain Scale 10-27  2020          Pain 12          Feelings 14          Clinical outcomes 7          Activities 11          GPS Total: 44            Review of Diagnostic Studies:    EMG/NCV 09/21/2020   Mild axonal peripheral neuropathy   Moderate left lateral femoral cutaneous nerve neuropathy   No electrophysiologic evidence for radiculopathy is seen in either leg   MRI of the  lumbar spine without contrast September 11, 2020, I have reviewed the images with the patient   L2-L3, L3-4, L4-L5: Small disc bulge, mild articular facet disease.  No significant canal or foraminal stenosis   L5-S1: Small disc bulge, articular facet disease.  Disc osteophyte complex formation.  No significant canal or foraminal stenosis   MRI of the left ankle September 11, 2020, radiology report: There is plantar fasciitis with partial tearing and the medial band. There is tendinopathy and/or partial injury of the peroneus brevis and longus tendons.   MRI of the left foot without contrast July 20, 2020, radiology report: Hallux valgus and moderate first metatarsophalangeal arthrosis.  There is first through third intermetatarsal bursitis.  Mild third tarsometatarsal arthrosis.     Review of Systems   Musculoskeletal: Positive for back pain.   Neurological: Positive for numbness.   All other systems reviewed and are negative.        Patient Active Problem List   Diagnosis   • Hyperlipidemia   • Fatigue   • Peripheral polyneuropathy   • Plantar fasciitis, bilateral   • Macromastia   • Spondylosis of lumbar region without myelopathy or radiculopathy   • Degeneration of lumbar or lumbosacral intervertebral disc   • Neuropathic pain involving left lateral femoral cutaneous nerve   • Morbid obesity (CMS/HCC)       Past Medical History:   Diagnosis Date   • Allergies    • Depression    • Gait abnormality    • Headache, tension-type    • History of reduction mammoplasty 02/13/2017    bilateral   • Kidney stone    • Memory loss    • Overweight    • Ruptured ovarian cyst          Past Surgical History:   Procedure Laterality Date   • APPENDECTOMY  2014   • APPENDECTOMY     • BILATERAL BREAST REDUCTION Bilateral 2/13/2017    Procedure: BREAST REDUCTION BILATERAL;  Surgeon: Bere Mendez MD;  Location: [facility];  Service:    • BILATERAL BREAST REDUCTION     • CYST REMOVAL      From groin   • CYST REMOVAL     •  "ENDOMETRIAL ABLATION  08/2016   • OTHER SURGICAL HISTORY Bilateral 2015    vein ablation    • TUBAL ABDOMINAL LIGATION  2015   • WISDOM TOOTH EXTRACTION           Family History   Problem Relation Age of Onset   • Depression Father    • Alcohol abuse Father    • Crohn's disease Sister    • Heart attack Maternal Grandfather    • Atrial fibrillation Maternal Grandfather    • Lung cancer Paternal Grandfather    • COPD Paternal Grandfather          Social History     Socioeconomic History   • Marital status:      Spouse name: Not on file   • Number of children: Not on file   • Years of education: Not on file   • Highest education level: Not on file   Tobacco Use   • Smoking status: Former Smoker     Types: Cigarettes   • Smokeless tobacco: Never Used   Substance and Sexual Activity   • Alcohol use: Yes     Comment: PT. STATES SHE HAS AN OCCASIONAL DRINK.   • Drug use: No   • Sexual activity: Defer           Current Outpatient Medications:   •  loratadine (CLARITIN) 10 MG tablet, Take 10 mg by mouth Daily., Disp: , Rfl:   •  Multiple Vitamins-Minerals (MULTIVITAMIN ADULT PO), Take 1 tablet by mouth Daily., Disp: , Rfl:       Allergies   Allergen Reactions   • Lyrica [Pregabalin] Dizziness     Increased appetite  Blurry vision         /70   Pulse 96   Temp 98.7 °F (37.1 °C)   Resp 16   Ht 175.3 cm (69.02\")   Wt 130 kg (287 lb 3.2 oz)   SpO2 97%   BMI 42.39 kg/m²       Physical Exam:  Constitutional: Patient appears well-developed and well-nourished.   HEENT: Head: Normocephalic and atraumatic.   Eyes: Conjunctivae and lids are normal.   Pupils: Equal, round, reactive to light.   Neck: Trachea normal. Neck supple. No JVD present.   Pulmonary Respiratory effort: No increased work of breathing or signs of respiratory distress. Auscultation of lungs: Clear to auscultation.   Cardiovascular Auscultation of heart: Normal rate and rhythm, normal S1 and S2, no murmurs.   Peripheral vascular exam: Femoral: " right 2+, left 2+. Posterior tibialis: right 2+ and left 2+. Dorsalis pedis: right 2+ and left 2+. No edema.   Musculoskeletal   Gait and station: Gait evaluation demonstrated a normal gait.  Able to walk on heels and toes  Lumbar spine: Passive and active range of motion are appropriate for her age and condition. Extension, flexion, lateral flexion, rotation of the lumbar spine did not increase or reproduce pain. Lumbar facet joint loading maneuvers are negative.   Akash test, Gaenslen's test, thigh thrust test, SI compression test, Yeoman's test are negative   Piriformis maneuvers are negative   Palpation of the bilateral greater trochanter, unrevealing   Examination of the Iliotibial band: unrevealing   Hip joints: The range of motion of the hip joints is almost full and without pain   Neurological:   Patient is alert and oriented to person, place, and time.   Speech: speech is normal.   Cortical function: Normal mental status.   Cranial nerves: Cranial nerves 2-12 intact.   Reflex Scores:  Right patellar: 2+  Left patellar: 2+  Right Achilles: 2+  Left Achilles: 2+  Motor strength: 5/5  Motor Tone: normal tone.   Involuntary movements: none.   Superficial/Primitive Reflexes: primitive reflexes were absent.   Right Cardona: absent  Left Cardona: absent  Right ankle clonus: absent  Left ankle clonus: absent   Babinsky: absent  Negative long tract signs. Straight leg raising test is negative. Femoral stretch sign is negative.   Sensation: No sensory loss. Sensory exam: intact to light touch, intact pain and temperature sensation, intact vibration sensation and normal proprioception.   Coordination: Normal finger to nose and heel to shin. Normal balance and negative Romberg's sign   Skin and subcutaneous tissue: Skin is warm and intact. No rash noted. No cyanosis.   Psychiatric: Judgment and insight: Normal. Orientation to person, place and time: Normal. Recent and remote memory: Intact. Mood and affect: Normal.      ASSESSMENT:   1. Neuropathic pain involving left lateral femoral cutaneous nerve    2. Spondylosis of lumbar region without myelopathy or radiculopathy    3. Degeneration of lumbar or lumbosacral intervertebral disc    4. Peripheral polyneuropathy    5. Plantar fasciitis, bilateral    6. Morbid obesity (CMS/HCC)        PLAN/MEDICAL DECISION MAKING: Patient reports a several year history of lower back pain, which began without incident.  She also has a longstanding history of anterolateral thigh pain attributed to meralgia paresthetica, particularly on the left side.  Patient underwent recent neurology consultation with Dr. Gonzales for peripheral neuropathy. She also has a history of left foot pain attributed to a plantar fascia tear. MRI of the lumbar spine revealed degenerative disc disease from L2-L3 through L5-S1 without significant canal or foraminal stenosis. EMG/NCV revealed mild peripheral polyneuropathy and moderate left lateral femoral cutaneous neuropathy. Patient reports that she underwent skin biopsies with her podiatrist and was diagnosed with small fiber neuropathy. Patient has failed conservative measures including oral analgesics and physical therapy.  Patient has failed to obtain pain relief with conservative measures, as referenced above. I have reviewed all available patient's medical records as well as previous therapies as referenced above. I had a lengthy conversation with Ms. Antonina Mensah regarding her chronic pain condition and potential therapeutic options including risks, benefits, alternative therapies, to name a few. Therefore, I have proposed the following plan:  1. Diagnostic studies: Depending on patient's response to therapy, we may recommend lumbar myelogram followed by CT post myelogram if she continues to struggle with pain  2. Pharmacological measures: Reviewed. Discussed.   A. Trial with Rheumate one tablet twice daily  B. Start alpha lipoid acid 8737-4427 mg per day  divided into 3 doses  C. Trial with prilocaine 2%, lidocaine 10%, imipramine 3%, capsaicin 0.001% and mannitol 20%  cream, apply 1 to 2 grams of cream to the affected areas every 4 to 6 hours as needed  3. Interventional pain management measures: Patient will be scheduled for diagnostic and therapeutic left lateral femoral cutaneous nerve block under fluoroscopy guidance due to body habitus and ultrasound guidance by hydrodissection technique.  Depending on response, we may repeat the block or work on her right side.  Other options to help identify the origin of her pain, may include diagnostic selective left L2 nerve root injection, and if beneficial, diagnostic and therapeutic left L2-L3 transforaminal epidural steroid injection. For her plantar fascia, we discussed regenerative therapies  4. Long-term rehabilitation efforts:  A. The patient does not have a history of falls. I did complete a risk assessment for falls  B. Patient will start a comprehensive physical therapy program for water therapy, therapeutic exercise, core strengthening, gait and balance training, neurodynamics, ultrasound, ASTYM, myofascial release, cupping and dry needling   C. Start an exercise program such as yoga, water therapy and swimming  D. Contrast therapy: Apply ice-packs for 15-20 minutes, followed by heating pads for 15-20 minutes to affected area   E. Referral to [facility]. Patient counseled on the importance of weight loss to help with overall health and pain control. Patient instructed to attempt weight loss.    5. The patient has been instructed to contact my office with any questions or difficulties. The patient understands the plan and agrees to proceed accordingly.      Patient Care Team:  Carlos Green MD as PCP - General (Family Medicine)     No orders of the defined types were placed in this encounter.        No future appointments.      Wilberto Sanchez MD     EMR  Dragon/Transcription disclaimer:  Much of this encounter note is an electronic transcription of spoken language to printed text. Electronic transcription of spoken language may permit erroneous, or at times, nonsensical words or phrases to be inadvertently transcribed. Although I have reviewed the note for such errors, some may still exist.

## 2020-10-21 PROBLEM — M51.37 DEGENERATION OF LUMBAR OR LUMBOSACRAL INTERVERTEBRAL DISC: Status: ACTIVE | Noted: 2020-10-21

## 2020-10-21 PROBLEM — M47.816 SPONDYLOSIS OF LUMBAR REGION WITHOUT MYELOPATHY OR RADICULOPATHY: Status: ACTIVE | Noted: 2020-10-21

## 2020-10-27 ENCOUNTER — OFFICE VISIT (OUTPATIENT)
Dept: PAIN MEDICINE | Facility: CLINIC | Age: 45
End: 2020-10-27

## 2020-10-27 VITALS
BODY MASS INDEX: 42.54 KG/M2 | TEMPERATURE: 98.7 F | HEIGHT: 69 IN | OXYGEN SATURATION: 97 % | HEART RATE: 96 BPM | WEIGHT: 287.2 LBS | RESPIRATION RATE: 16 BRPM | SYSTOLIC BLOOD PRESSURE: 110 MMHG | DIASTOLIC BLOOD PRESSURE: 70 MMHG

## 2020-10-27 DIAGNOSIS — G57.12 NEUROPATHIC PAIN INVOLVING LEFT LATERAL FEMORAL CUTANEOUS NERVE: Primary | ICD-10-CM

## 2020-10-27 DIAGNOSIS — G62.9 PERIPHERAL POLYNEUROPATHY: ICD-10-CM

## 2020-10-27 DIAGNOSIS — E66.01 MORBID OBESITY (HCC): ICD-10-CM

## 2020-10-27 DIAGNOSIS — M51.37 DEGENERATION OF LUMBAR OR LUMBOSACRAL INTERVERTEBRAL DISC: ICD-10-CM

## 2020-10-27 DIAGNOSIS — M72.2 PLANTAR FASCIITIS, BILATERAL: ICD-10-CM

## 2020-10-27 DIAGNOSIS — M47.816 SPONDYLOSIS OF LUMBAR REGION WITHOUT MYELOPATHY OR RADICULOPATHY: ICD-10-CM

## 2020-10-27 DIAGNOSIS — G57.12 NEUROPATHIC PAIN INVOLVING LEFT LATERAL FEMORAL CUTANEOUS NERVE: ICD-10-CM

## 2020-10-27 PROCEDURE — 99204 OFFICE O/P NEW MOD 45 MIN: CPT | Performed by: ANESTHESIOLOGY

## 2020-10-27 RX ORDER — ME-TETRAHYDROFOLATE/B12/HRB236 1-1-500 MG
1 CAPSULE ORAL DAILY
Qty: 90 CAPSULE | Refills: 1 | Status: SHIPPED | OUTPATIENT
Start: 2020-10-27 | End: 2020-10-29 | Stop reason: SDUPTHER

## 2020-10-27 RX ORDER — ST. JOHN'S WORT 300 MG
400 CAPSULE ORAL 3 TIMES DAILY
Qty: 180 CAPSULE | Refills: 5 | Status: SHIPPED | OUTPATIENT
Start: 2020-10-27

## 2020-10-27 RX ORDER — ME-TETRAHYDROFOLATE/B12/HRB236 1-1-500 MG
1 CAPSULE ORAL DAILY
Qty: 90 CAPSULE | Refills: 1 | Status: SHIPPED | OUTPATIENT
Start: 2020-10-27 | End: 2020-10-27

## 2020-10-29 DIAGNOSIS — G57.12 NEUROPATHIC PAIN INVOLVING LEFT LATERAL FEMORAL CUTANEOUS NERVE: ICD-10-CM

## 2020-10-29 RX ORDER — ME-TETRAHYDROFOLATE/B12/HRB236 1-1-500 MG
1 CAPSULE ORAL DAILY
Qty: 90 CAPSULE | Refills: 1 | Status: SHIPPED | OUTPATIENT
Start: 2020-10-29

## 2020-11-10 DIAGNOSIS — G57.12 NEUROPATHIC PAIN INVOLVING LEFT LATERAL FEMORAL CUTANEOUS NERVE: Primary | ICD-10-CM

## 2020-11-12 ENCOUNTER — HOSPITAL ENCOUNTER (OUTPATIENT)
Dept: NUTRITION | Facility: HOSPITAL | Age: 45
Setting detail: RECURRING SERIES
Discharge: HOME | End: 2020-11-12

## 2020-11-12 PROCEDURE — 97802 MEDICAL NUTRITION INDIV IN: CPT

## 2020-11-13 VITALS — BODY MASS INDEX: 42.45 KG/M2 | WEIGHT: 286.6 LBS | HEIGHT: 69 IN

## 2020-11-13 DIAGNOSIS — G57.12 NEUROPATHIC PAIN INVOLVING LEFT LATERAL FEMORAL CUTANEOUS NERVE: ICD-10-CM

## 2020-11-13 NOTE — CONSULTS
"Adult Outpatient Nutrition  Assessment/PES    Patient Name:  Antonina Mensah  YOB: 1975  MRN: [id number]    Assessment Date:  11/13/2020    Comments:      This medical referred consult was provided via telehealth as patient was unable to attend an in-office appointment today due to the COVID-19 crisis. Consent for treatment was given verbally. RD spent a total of 60 minutes with patient today.      Patient is a 45 year old female seen today for an initial nutrition visit. Patient reported she has been resistant  a nutrition visit because she stated she feels like she knows what she needs to do, but she is here today because of MD recommendations. Patient reported she has gained 80 lbs over the past 3-4 years. She stated she is in great amount of pain due to her weight, so she would like to lose about 80 lbs to help with this. She shared she and her  watched \"What the Jc\" documentary on [employer] during the summer 2020 and tried to completely switch over to a plant based, vegan diet, \"almost overnight.\" She shared she purchased the \"[place] [employer]\" meal plan, which is another plant based documentary on [employer], and she has been trying to follow it. Patient stated she encounters several barriers to following a healthy lifestyle, which she described as \"business and laziness.\" Patient also described herself as a \"sugar-holic\" and stated she does not believe she will ever be able to go without sugar.     RD asked patient if she had any questions or anything in particular she would like to discuss today. Patient replied, \"No, I am sure you have a lot of great information, but there is also a lot a great information I could easily find in books or on the Internet. After going through patient's dietary recall, RD asked patient permission if she could offer recommendations or ideas. Patient answered \"No, I think I am okay.\" RD used motivational interviewing and asked patient what is the most " "important thing in her life right now? Patient replied, \"Reducing pain in my body by losing weight.\" RD asked patient what needs to happen in order for her to accomplish her goal? Patient replied, \"I will need time and energy to stay on track.\" RD asked patient what she feels is the first and achievable next step the patient needs to take? Patient replied, \"That would be starting out with trying to follow the [place] [employer] meal plan 4 nights per week.\" RD asked patient is she feels this plant based diet and meal plan is sustainable for her to follow for her entire life? Patient replied, \"yes.\" RD asked patient what barriers or challenges she will encounter and how she plans to overcome them? Patient replied, \"A barrier I will face is feeling too busy or tired to put in the effort. Something that will help me is planning my meals and grocery shopping ahead of time.\" RD asked patient if she would like to discuss anything else today or if she would like any additional resources or support from RD? Patient replied, \"No.\" RD encouraged patient to contact her with any needs.     Goal:  1) Follow [place] [employer] [place] meal plan 4 nights per week.       Total of 60 minutes spent with patient on nutrition counseling. Education based on Academy of Dietetics and Nutrition guidelines. Patient was provided with RD's contact information. Follow up visit is scheduled for 12/08/20 at 4:15 PM. Thank you for this referral.      General Info     Row Name 11/13/20 0809       Today's Session    Person(s) attending today's session  Patient     Services Used Today?  No       General Information    How Well Do You Speak English?  very well    Preferred Language  English    Are you able to read and write English?  Yes    Lives With  spouse          Anthropometrics     Row Name 11/13/20 0816 11/13/20 0811       Anthropometrics    Height  175.3 cm (69.02\")  175.3 cm (69.02\")    Weight  --  130 kg (286 lb 9.6 oz)       Ideal Body " Weight (IBW)    Ideal Body Weight (IBW) (kg)  66.47  66.47    % Ideal Body Weight  --  195.58       Body Mass Index (BMI)    BMI (kg/m2)  --  42.39        Nutritional Info/Activity     Row Name 11/13/20 0811       Nutritional Information    Have you had weight changes?  Yes    Describe weight changes  Patient reported she has gained 80 lbs in the past 3-4 years.    What is your desired body weight?  90.7 kg (200 lb)    Have you tried to lose weight before?  Yes    List programs tried, date, and success  Plant based diet    What is your motivation to lose weight?  To reduce pain and be healthy    Do you have difficulty chewing food?  No    Functional Status  able to prepare meals;able to purchase food    List any food cravings/trigger foods you have  French fries    How often during the day do you find yourself snacking?  Patient reported she tends to eat 1-2 meals in the day and snack throughout the rest of the day.    How often do you eat out and where?  3-4 nights per week at fast food restaurants    What is the biggest challenge you have with your diet?  Food preperation;Cooking;Poor choices    What type of support do you currently use to help you with your health issues?  Spouse, [employer] [employer] meal plan    Enter everything you can remember eating in the last 24 hours (1 day) Breakfast: Coffee with almond milk    Snack: V8 energy drink and a Christy bar    Snack: 6 pack of Peanut butter crackers with a glass of sweet tea    Lunch: [employer] large neil with 3 packets of BBQ sauce    Dinner: Chinese take out with chinese chicken and shrimp, egg roll       Eating Environment    Eating environment  Alone;Family       Physical Activity    Are you currently involved in an activity/exercise program?   Yes    Describe physical activity  Physical Therapy a few times per week          Estimated/Assessed Needs     Row Name 11/13/20 0816 11/13/20 0811       Calculation Measurements    Weight Used For Calculations  130 kg (286  "lb 9.6 oz)  --    Height  175.3 cm (69.02\")  175.3 cm (69.02\")       Estimated/Assessed Needs    Additional Documentation  Hinsdale-St. Jeor Equation (Group)  --       Hinsdale-St. Jeor Equation    RMR (Hinsdale-St. Jeor Equation)  [id number]  --    Hinsdale-St. Jeor Activity Factors  1.2  --    Activity Factors (H[place]dale-St. Jeor)  [id number]  --                Problem/Interventions:  Problem 1     Row Name 11/13/20 0817          Nutrition Diagnoses Problem 1    Problem 1  Overweight/Obesity     Etiology (related to)  -- lifestyle     Signs/Symptoms (evidenced by)  BMI     BMI  Greater than 40                 Intervention Goal     Row Name 11/13/20 0818          Intervention Goal    General  Meet nutritional needs for age/condition     Weight  Appropriate weight loss             Education/Evaluation     Row Name 11/13/20 0818          Education    Education  Education offered and refused        Monitor/Evaluation    Monitor  PO intake;Weight           Electronically signed by:  Sandi Marsh RD  11/13/20 08:19 EST  "

## 2020-11-15 ENCOUNTER — APPOINTMENT (OUTPATIENT)
Dept: PREADMISSION TESTING | Facility: HOSPITAL | Age: 45
End: 2020-11-15

## 2020-11-15 PROCEDURE — C9803 HOPD COVID-19 SPEC COLLECT: HCPCS

## 2020-11-15 PROCEDURE — U0004 COV-19 TEST NON-CDC HGH THRU: HCPCS

## 2020-11-16 LAB — SARS-COV-2 RNA RESP QL NAA+PROBE: NOT DETECTED

## 2020-11-18 ENCOUNTER — OUTSIDE FACILITY SERVICE (OUTPATIENT)
Dept: PAIN MEDICINE | Facility: CLINIC | Age: 45
End: 2020-11-18

## 2020-11-18 PROCEDURE — 76942 ECHO GUIDE FOR BIOPSY: CPT | Performed by: ANESTHESIOLOGY

## 2020-11-18 PROCEDURE — 64450 NJX AA&/STRD OTHER PN/BRANCH: CPT | Performed by: ANESTHESIOLOGY

## 2020-11-18 PROCEDURE — 99152 MOD SED SAME PHYS/QHP 5/>YRS: CPT | Performed by: ANESTHESIOLOGY

## 2020-11-19 ENCOUNTER — TELEPHONE (OUTPATIENT)
Dept: PAIN MEDICINE | Facility: CLINIC | Age: 45
End: 2020-11-19

## 2020-11-19 NOTE — TELEPHONE ENCOUNTER
diagnostic and therapeutic left lateral femoral cutaneous nerve block under fluoroscopy guidance due to body habitus and ultrasound guidance by hydrodissection technique.  11/18/2020.    Called patient to try and check on her after her procedure but no answer I left a VM and I sent out a apt. Reminder in the mail.

## 2020-12-08 ENCOUNTER — APPOINTMENT (OUTPATIENT)
Dept: NUTRITION | Facility: HOSPITAL | Age: 45
End: 2020-12-08

## (undated) DEVICE — JACKSON-PRATT 100CC BULB RESERVOIR: Brand: CARDINAL HEALTH

## (undated) DEVICE — SUT MNCRYL PLS ANTIB UD 4/0 PS2 18IN

## (undated) DEVICE — SUT GUT PLN FAST ABS 5/0 PC1 18IN 1915G

## (undated) DEVICE — BLAKE SILICONE DRAIN, 10 FR ROUND, HUBLESS WITH 1/8" TROCAR: Brand: BLAKE

## (undated) DEVICE — AIRWY 90MM NO9

## (undated) DEVICE — BRA COMPR SURG STL958 QUEEN

## (undated) DEVICE — PK CHST BRST 10

## (undated) DEVICE — ADAPT ST INFUS ADMIN SYR 70IN

## (undated) DEVICE — MEDI-VAC NON-CONDUCTIVE SUCTION TUBING: Brand: CARDINAL HEALTH

## (undated) DEVICE — SUT SILK 0 SH 30IN K834H

## (undated) DEVICE — SUT MONOCRYL PLS ANTIB UND 3/0  PS1 27IN

## (undated) DEVICE — DRSNG WND BORDR/ADHS NONADHR/GZ LF 2X2IN STRL

## (undated) DEVICE — GLV SURG SENSICARE W/ALOE PF LF SZ6 STRL

## (undated) DEVICE — MEDI-VAC YANKAUER SUCTION HANDLE W/BULBOUS TIP: Brand: CARDINAL HEALTH

## (undated) DEVICE — CANNULA,ADULT,SOFT-TOUCH,7TUBE,SC: Brand: MEDLINE

## (undated) DEVICE — 100% SILICONE FOLEY TRAY,16 FR/CH (5.3 MM), 5 ML CATHETER PRE-CONNECTED TO 2000 ML DRAINAGE BAG WITH LUER-LOCK SAMPLING: Brand: DOVER

## (undated) DEVICE — SPNG GZ WOVN 4X4IN 12PLY 10/BX STRL

## (undated) DEVICE — BANDAGE,GAUZE,BULKEE II,4.5"X4.1YD,STRL: Brand: MEDLINE

## (undated) DEVICE — SKIN AFFIX SURG ADHESIVE 72/CS 0.55ML: Brand: MEDLINE

## (undated) DEVICE — 3M™ STERI-STRIP™ REINFORCED ADHESIVE SKIN CLOSURES, R1547, 1/2 IN X 4 IN (12 MM X 100 MM), 6 STRIPS/ENVELOPE: Brand: 3M™ STERI-STRIP™